# Patient Record
Sex: FEMALE | Race: WHITE | NOT HISPANIC OR LATINO | Employment: UNEMPLOYED | URBAN - METROPOLITAN AREA
[De-identification: names, ages, dates, MRNs, and addresses within clinical notes are randomized per-mention and may not be internally consistent; named-entity substitution may affect disease eponyms.]

---

## 2018-04-20 ENCOUNTER — PRE VISIT (OUTPATIENT)
Dept: NEUROLOGY | Facility: CLINIC | Age: 53
End: 2018-04-20

## 2018-04-20 DIAGNOSIS — G60.0 CMT (CHARCOT-MARIE-TOOTH DISEASE): Primary | ICD-10-CM

## 2018-04-20 NOTE — TELEPHONE ENCOUNTER
CMT RETURN PATIENT    How has their mobility been since the last office visit?   If there has been an increase in falls or any mobility concerns, have them see the Physical Therapist. Do they have any assistive devices or are they interested in finding out more about how to obtain these.has fallen a few times,     Any concerns about hand weakness or pain? Trouble doing things around the house such as opening jars, zipping and buttoning, writing, or other ADL s?   If so have them see the Occupational Therapist hand weakness is worse than leg weakness - should OT,     Do they know what type of CMT they have?   If not and they would like to find out, schedule them with the genetic counselor. They can also schedule with the GC if they have other questions regarding family planning or have any other questions. CMT1A     Do they wear orthotics (foot or AFOs)?   If so, are they working well for them? If not, schedule with the orthotist.  If they don t wear orthotics and would like to meet with them to see what options there are for bracing, schedule with the orthotist. Does have a new set of AFOs, has an orthotist at home     We will have a  available in clinic in case you have any questions. They can help connect pt s with community resources and discuss disability if applicable. She will stop in to introduce herself while they are in clinic. Facilitate disability     There will also be a representative from the [facility] here in clinic as well.    What are the pt s goals for this visit? 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

## 2018-05-01 ENCOUNTER — OFFICE VISIT (OUTPATIENT)
Dept: NEUROLOGY | Facility: CLINIC | Age: 53
End: 2018-05-01
Payer: COMMERCIAL

## 2018-05-01 ENCOUNTER — HOSPITAL ENCOUNTER (OUTPATIENT)
Dept: OCCUPATIONAL THERAPY | Facility: CLINIC | Age: 53
Setting detail: THERAPIES SERIES
End: 2018-05-01
Attending: FAMILY MEDICINE
Payer: COMMERCIAL

## 2018-05-01 ENCOUNTER — RESEARCH ENCOUNTER (OUTPATIENT)
Dept: NEUROLOGY | Facility: CLINIC | Age: 53
End: 2018-05-01

## 2018-05-01 ENCOUNTER — TELEPHONE (OUTPATIENT)
Dept: NEUROLOGY | Facility: CLINIC | Age: 53
End: 2018-05-01

## 2018-05-01 VITALS
SYSTOLIC BLOOD PRESSURE: 169 MMHG | DIASTOLIC BLOOD PRESSURE: 84 MMHG | BODY MASS INDEX: 20.49 KG/M2 | HEIGHT: 64 IN | HEART RATE: 63 BPM | OXYGEN SATURATION: 100 % | WEIGHT: 120 LBS

## 2018-05-01 DIAGNOSIS — G60.0 CMT (CHARCOT-MARIE-TOOTH DISEASE): Primary | ICD-10-CM

## 2018-05-01 PROCEDURE — 97535 SELF CARE MNGMENT TRAINING: CPT | Mod: GO | Performed by: OCCUPATIONAL THERAPIST

## 2018-05-01 PROCEDURE — 97165 OT EVAL LOW COMPLEX 30 MIN: CPT | Mod: GO | Performed by: OCCUPATIONAL THERAPIST

## 2018-05-01 PROCEDURE — 99215 OFFICE O/P EST HI 40 MIN: CPT | Performed by: PSYCHIATRY & NEUROLOGY

## 2018-05-01 PROCEDURE — 40000250 ZZH STATISTIC VISIT MD CLINIC: Performed by: OCCUPATIONAL THERAPIST

## 2018-05-01 ASSESSMENT — PAIN SCALES - GENERAL: PAINLEVEL: MILD PAIN (2)

## 2018-05-01 NOTE — LETTER
2018         RE: Lakesha Keita  [address] [facility] [address]        Dear Colleague,    Thank you for referring your patient, Lakesha Keita, to the [facility]. Please see a copy of my visit note below.      [id number]    May 1, 2018            Michela Souza MD   [address]   [place] [address]      RE:  Lakesha Keita   MRN:  [id number]   :  61      Dear Dr. Souza:      I saw Lakesha Keita and her  at the [facility] where I saw her for followup of her diagnosed CMT 1a.  Ms. Keita was last seen in 2016.  Since then, there has been some progression of disability.  In particular, she may be in need of some accommodations to help her with her work.  She has worked as a  for 34 years, but there but a new employer as increased the demands for rate of chart completion.  She has considerable fatigue after full work days as well.  She falls infrequently and is otherwise doing fairly well.  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      EXAMINATION:  The patient appears well.  Blood pressure is 168 systolic.  Vital signs are otherwise normal.  Cranial examination is normal.  There is no neck flexion or extension weakness and her previously documented neck discomfort has resolved.  Examination of the extremities demonstrates essentially full range of motion in the distal extremities.  Perception of light touch is reduced distal to the hand dorsa and the thighs.  Position sense is reduced to full excursions at the right foot and is absent at the left foot.  Vibration scores are 4 at the right tibial tuberosity and 0 at the left.  Ankle dorsiflexion strength is 3 bilaterally.  Interosseous strength is  also 3 at APB and FDI.  She walks independently, but cautiously.      I met with Ms. Nguyen and her  for greater than 40 minutes, the majority of which was spent in counseling and coordination of care.  Her examination demonstrates modest progression since she was seen in 2016, insufficient to require an implication of other medical factors.  She does have a remote history of carpal tunnel syndrome, but currently denies focal sensory symptoms in the median innervated fingers, wrist pain, or sensory symptoms exacerbated by activity.  Furthermore, her weakness is symmetrically distributed between median and ulnar distribution of the pattern consistent with CMT 1a.  Therefore, active carpal tunnel syndrome does not seem likely.  Lakesha did meet with our occupational therapist who recommended options for meeting with an assistive .  That said, however, we initiated a conversation of her options should her current work demands not be appropriate for her abilities.  She will also meet with our physical therapist.  She has met with her orthotist and received new carbon fiber braces; however, I explained that given some infra occasional ankle instability, she may benefit from having them built up outside of the lateral malleolus.  Finally, we reviewed current research interventions for CMT 1a and the potential for new therapies going forward.      I will meet with Ms. Nguyen in followup in 1 year or earlier if needed.  I have offered a social work consultation if necessary to assist with changes in her occupational situation.         Sincerely,      MARCELLA BANKS MD             D: 2018   T: 2018   MT: TENA      Name:     LAKESHA NGUYEN   MRN:      [id number]        Account:      [id number]   :      1965      Document: [id number]       cc: Michela Souza MD       Again, thank you for allowing me to participate in the care of your patient.         Sincerely,        Scott Vyas MD

## 2018-05-01 NOTE — MR AVS SNAPSHOT
After Visit Summary   2018    Lakesha Keita    MRN: [id number]           Patient Information     Date Of Birth          1965        Visit Information        Provider Department      2018 4:00 PM Scott Vyas MD [facility]         Follow-ups after your visit        Who to contact     If you have questions or need follow up information about today's clinic visit or your schedule please contact [facility] directly at [phone].  Normal or non-critical lab and imaging results will be communicated to you by MyChart, letter or phone within 4 business days after the clinic has received the results. If you do not hear from us within 7 days, please contact the clinic through MyChart or phone. If you have a critical or abnormal lab result, we will notify you by phone as soon as possible.  Submit refill requests through [employer] or call your pharmacy and they will forward the refill request to us. Please allow 3 business days for your refill to be completed.          Additional Information About Your Visit        MyChart Information     [employer] is an electronic gateway that provides easy, online access to your medical records. With [employer], you can request a clinic appointment, read your test results, renew a prescription or communicate with your care team.     To sign up for [employer] visit the website at [email or web address]   You will be asked to enter the access code listed below, as well as some personal information. Please follow the directions to create your username and password.     Your access code is: [email or web address]  Expires: 2018  3:52 PM     Your access code will  in 90 days. If you need help or a new code, please contact your [facility] or call [phone] for assistance.        Care EveryWhere ID     This is your Care EveryWhere ID. This could be used by other organizations to access your [place]  "medical records  [id number]        Your Vitals Were     Pulse Height Pulse Oximetry BMI (Body Mass Index)          63 1.626 m (5' 4\") 100% 20.6 kg/m2         Blood Pressure from Last 3 Encounters:   05/01/18 169/84   01/05/16 104/60    Weight from Last 3 Encounters:   05/01/18 54.4 kg (120 lb)   01/05/16 55.1 kg (121 lb 8 oz)              Today, you had the following     No orders found for display       Primary Care Provider Office Phone # Fax #    Michela Damian [phone]       [facility] [address] [facility] [address]        Equal Access to Services     ZULMA BLOOM : Alla Delcid, annabelle umanzor, qaedilia kaalmajayshree godwin, jaret rayo. So [facility] [phone].    ATENCIÓN: Si habla español, tiene a cruz disposición servicios gratuitos de asistencia lingüística. Llame al [phone].    We comply with applicable federal civil rights laws and [place] laws. We do not discriminate on the basis of race, color, national origin, age, disability, sex, sexual orientation, or gender identity.            Thank you!     Thank you for choosing [facility]  for your care. Our goal is always to provide you with excellent care. Hearing back from our patients is one way we can continue to improve our services. Please take a few minutes to complete the written survey that you may receive in the mail after your visit with us. Thank you!             Your Updated Medication List - Protect others around you: Learn how to safely use, store and throw away your medicines at www.disposemymeds.org.          This list is accurate as of 5/1/18  5:20 PM.  Always use your most recent med list.                   Brand Name Dispense Instructions for use Diagnosis    aspirin 81 MG tablet      Take 1 tablet by mouth daily.        ATORVASTATIN CALCIUM PO      Take 20 mg by mouth daily        BENEFIBER PO           vitamin D3 12324 UNITS " capsule    CHOLECALCIFEROL     Take 50,000 Units by mouth every 7 days

## 2018-05-01 NOTE — PROGRESS NOTES
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  Gets neck injections every 6 months. History of bilateral hip replacements.     Cardio-respiratory status: NA. Does do cardio exercise regularly (bike, elliptical, walking)     Living environment:  House     Living environment barriers:  2 level walk-out, 6+6 stairs within home (1 railing present)                   Current assistance/living environment:  Lives with spouse       Current mobility equipment:  Orthotics: bilateral AFO's     Current ADL equipment:  Shower/tub chair   Toilet safety frame   Sometimes uses a sock aide due to hip impairment (previous replacements)    Technology Used: smartphone, computer, has an adaptive mouse for work, and spouse recently bought her a \"tad\" keyboard to help with work     Patient concerns/goals: Pt's primary goal today is learning adaptations and available equipment for increased speed and effficiency with computer use.  She has difficulty controlling her mouse, especially when needing to select between multiple or small options on the screen.    Evaluation   Interview completed     strength: R hand 23 lb, L hand 26 lb  (mean is 57 lb right, 47 lb left)    Tripod Pinch: 6 lb Right, 5 lb Left (mean is 16 right, left 15)     Lateral pinch: 8 lb bilaterally (mean is 15 right, 14 left)    Cognition:  WNL    Coordination: 9 hole peg score: R hand 30, L hand 32  (Norm is approx 20 seconds for R and 22 for L)      Fall Risk Screen:   Has the patient fallen 2 or more times in the last year? No      Has the patient fallen and had an injury in the " past year? No       Timed Up and Go Score: not tested    Is the patient a fall risk? Yes, department fall risk interventions implemented     Impairments:  Fatigue  Muscle atrophy  Coordination     Treatment diagnosis:  Impaired activities of daily living    Assessment of Occupational Performance: 1-3 Performance Deficits  Identified Performance Deficits (ie: feeding, social skills): opening containers, carrying objects, computer use/typing  Clinical Decision Making (Complexity): Low complexity     Recommendations/Plan of care:  Patient would benefit from interventions to enhance safety and independence.  Rehab potential good for stated goals.  1 session evaluation & treatment.  Recommend referral to:  Either [facility], [facility], or the STAR program to work with an Assistive     Goals:   Target date:   Patient, family and/or caregiver will verbalize understanding of evaluation results and implications for functional performance.  Patient, family and/or caregiver will verbalize/demonstrate understanding of compensatory methods /equipment to enhance functional independence and safety.  Patient, family and/or caregiver will verbalize/demonstrate understanding of home program.  Patient, family and/or caregiver will verbalize/demonstrate understanding of positioning techniques/equipment.      Educational assessment/barriers to learning:  No barriers noted      Treatment provided this date:   Self care/home management, 40 minutes.  Educated on strategies for energy conservation as they relate to CMT, in particular avoiding overuse syndrome with hands with prolonged typing.  Educated on available types of ergonomic keyboard and/or mouse, and how to obtain a trial device through the [place] STAR program. Also provided info for the [facility] and the [facility], which offers device demonstration and trials of higher tech devices like eye-gaze technology. Pt was informed that eye gaze  technology is typically designed for persons with no UE function, so this would likely not be a faster option. Recommend following up with an assistive  for further education on available technology.  Pt was also instructed on ways to modify her computer settings, such as slowing down the keyboard and mouse response rate, limiting the number of icons in her Epic workspace, and working with her supervisor to develop more pre-set templates to avoid the amount of redundant typing.  Educated on home computer ergonomics, including ensuring that knees, hips, and elbows are 90 degrees, and consideration of forearm supports when typing.      Response to treatment/recommendations: Pt and spouse demo'd understanding of all instruction    Goal attainment:  All goals met    Risks and benefits of evaluation/treatment have been explained.  Patient, family and/or caregiver are in agreement with Plan of Care.     Timed Code Treatment Minutes: 40  Total Treatment Time (sum of timed and untimed services): 55    Signature: AG Ma/JIA   Date: 5/1/2018

## 2018-05-01 NOTE — PROGRESS NOTES
Diane velasquez [facility] Neuromuscular Registry    IRB # [id number]  PI: Jude Freire MD, PhD  : Jud Butts    Patient was approached for possible participation for the above study. The current approved IRB consent form was discussed and explained to the patient.  It was discussed that involvement with the study is voluntary and refusal to participate would not involve penalty or decrease benefits at which the patient is entitled, and the subject may discontinue his/her involvement at any time without penalty or loss in benefits. We also discussed that this study does not have follow up visits or procedures. Patient was informed that an additional contact might occur if data needed was not found in patient s medical record. The patient was given time to review and ask any questions about the consent. Patient was shown contact information for PI and study staff in consent for future questions. Patient verbalized understanding of consent and study by restating the purpose, procedures, duration, risk, confidentiality of PHI, and voluntarily participation. Patient printed, signed and dated the consent and HIPAA form prior to study involvement. A copy was given to the patient for their records.     Subject Consent/HIPAA : SIGNED ON 5.1.2018

## 2018-05-01 NOTE — PATIENT INSTRUCTIONS
Let us know how things go with the work situation. If needed I will have our  consult with you by phone.

## 2018-05-01 NOTE — NURSING NOTE
"Lakesha Keita's goals for this visit include: recheck  She requests these members of her care team be copied on today's visit information:     PCP: Michela Munguia    Referring Provider:  No referring provider defined for this encounter.    Chief Complaint   Patient presents with     RECHECK       Initial /84  Pulse 63  Ht 1.626 m (5' 4\")  Wt 54.4 kg (120 lb)  SpO2 100%  BMI 20.6 kg/m2 Estimated body mass index is 20.6 kg/(m^2) as calculated from the following:    Height as of this encounter: 1.626 m (5' 4\").    Weight as of this encounter: 54.4 kg (120 lb).  Medication Reconciliation: complete    "

## 2018-05-01 NOTE — IP AVS SNAPSHOT
"                  MRN:[id number]                      After Visit Summary   2018    Lakesha Keita    MRN: [id number]           Visit Information        Provider Department      2018  3:15 PM Laci Anderson, OT [facility]        Your next 10 appointments already scheduled     May 01, 2018  4:00 PM CDT   Return Visit with Scott Vyas MD   [facility]    [address] [place] [id number] [phone]                Further instructions from your care team       OT recommendations from May 1, 2018    1. Make sure that mouse and keyboard settings are slow enough that you are able to maintain control of the mouse and your typing.  2.  Utilizing as many \"dot phrases\" in your work as possible.  For instance, when you email providers, if you have a template already set up, it can make the messaging providers quicker  3. Look into the [place] Star Program  OR the PACER program for device loaner programs   4. We will look into other options close to home for meeting with an ATP (Assistive ) to help with more training and trying out equipment for your typing/mousing.  5. Another resource is working with your Occupational Health Department to see if they have any adaptive devices for you to use at work.   6. For the \"non-slip\" material, what we use is called \"Dycem\"       Laci Andersno OTR/L  [facility]  [email or web address], Phone: [phone]  [phone]      [employer] Information     [employer] lets you send messages to your doctor, view your test results, renew your prescriptions, schedule appointments and more. To sign up, go to [email or web address] . Click on \"Log in\" on the left side of the screen, which will take you to the Welcome page. Then click on \"Sign up Now\" on the right side of the page.     You will be asked to enter the access code " listed below, as well as some personal information. Please follow the directions to create your username and password.     Your access code is: [email or web address]  Expires: 2018  3:52 PM     Your access code will  in 90 days. If you need help or a new code, please call your [place] clinic or [phone].        Care EveryWhere ID     This is your Care EveryWhere ID. This could be used by other organizations to access your [place] medical records  [id number]        Equal Access to Services     ZULMA BLOOM : Alla Delcid, wacarol umanzor, lubna brooksalnakul godwin, jaret tomlin . So [facility] [phone].    ATENCIÓN: Si habla español, tiene a cruz disposición servicios gratuitos de asistencia lingüística. Llame al [phone].    We comply with applicable federal civil rights laws and [place] laws. We do not discriminate on the basis of race, color, national origin, age, disability, sex, sexual orientation, or gender identity.

## 2018-05-01 NOTE — DISCHARGE INSTRUCTIONS
"OT recommendations from May 1, 2018    1. Make sure that mouse and keyboard settings are slow enough that you are able to maintain control of the mouse and your typing.  2.  Utilizing as many \"dot phrases\" in your work as possible.  For instance, when you email providers, if you have a template already set up, it can make the messaging providers quicker  3. Look into the [place] Star Program  OR the PACER program for device loaner programs   4. We will look into other options close to home for meeting with an ATP (Assistive ) to help with more training and trying out equipment for your typing/mousing.  5. Another resource is working with your Occupational Health Department to see if they have any adaptive devices for you to use at work.   6. For the \"non-slip\" material, what we use is called \"Dycem\"       Laci Anderson OTR/L  [facility]  [email or web address], Phone: [phone]  [phone]    "

## 2018-05-01 NOTE — TELEPHONE ENCOUNTER
Called and reminded pt of her 2:30appt today in [facility] CMT clinic.  Anahi Pacheco CMA

## 2018-05-02 NOTE — PROGRESS NOTES
May 1, 2018            Michela Souza MD   [address]   [place] [address]      RE:  Lakesha Keita   MRN:  [id number]   :  61      Dear Dr. Souza:      I saw Lakesha Keita and her  at the [facility] where I saw her for followup of her diagnosed CMT 1a.  Ms. Keita was last seen in 2016.  Since then, there has been some progression of disability.  In particular, she may be in need of some accommodations to help her with her work.  She has worked as a  for 34 years, but there but a new employer as increased the demands for rate of chart completion.  She has considerable fatigue after full work days as well.  She falls infrequently and is otherwise doing fairly well.  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      EXAMINATION:  The patient appears well.  Blood pressure is 168 systolic.  Vital signs are otherwise normal.  Cranial examination is normal.  There is no neck flexion or extension weakness and her previously documented neck discomfort has resolved.  Examination of the extremities demonstrates essentially full range of motion in the distal extremities.  Perception of light touch is reduced distal to the hand dorsa and the thighs.  Position sense is reduced to full excursions at the right foot and is absent at the left foot.  Vibration scores are 4 at the right tibial tuberosity and 0 at the left.  Ankle dorsiflexion strength is 3 bilaterally.  Interosseous strength is also 3 at APB and FDI.  She walks independently, but cautiously.      I met with Ms. Keita and her  for greater than 40 minutes, the majority of which was spent in counseling and coordination of care.  Her examination demonstrates modest progression  since she was seen in 2016, insufficient to require an implication of other medical factors.  She does have a remote history of carpal tunnel syndrome, but currently denies focal sensory symptoms in the median innervated fingers, wrist pain, or sensory symptoms exacerbated by activity.  Furthermore, her weakness is symmetrically distributed between median and ulnar distribution of the pattern consistent with CMT 1a.  Therefore, active carpal tunnel syndrome does not seem likely.  Lakesha did meet with our occupational therapist who recommended options for meeting with an assistive .  That said, however, we initiated a conversation of her options should her current work demands not be appropriate for her abilities.  She will also meet with our physical therapist.  She has met with her orthotist and received new carbon fiber braces; however, I explained that given some infra occasional ankle instability, she may benefit from having them built up outside of the lateral malleolus.  Finally, we reviewed current research interventions for CMT 1a and the potential for new therapies going forward.      I will meet with Ms. Nguyen in followup in 1 year or earlier if needed.  I have offered a social work consultation if necessary to assist with changes in her occupational situation.         Sincerely,      MARCELLA BANKS MD             D: 2018   T: 2018   MT: TENA      Name:     LAKESHA NGUYEN   MRN:      [id number]        Account:      [id number]   :      1965      Document: [id number]       cc: Michela Souza MD

## 2018-05-12 ENCOUNTER — HEALTH MAINTENANCE LETTER (OUTPATIENT)
Age: 53
End: 2018-05-12

## 2018-05-22 ENCOUNTER — MYC MEDICAL ADVICE (OUTPATIENT)
Dept: NEUROLOGY | Facility: CLINIC | Age: 53
End: 2018-05-22

## 2019-01-25 ENCOUNTER — TRANSFERRED RECORDS (OUTPATIENT)
Dept: HEALTH INFORMATION MANAGEMENT | Facility: CLINIC | Age: 54
End: 2019-01-25

## 2019-03-10 ENCOUNTER — MYC MEDICAL ADVICE (OUTPATIENT)
Dept: NEUROLOGY | Facility: CLINIC | Age: 54
End: 2019-03-10

## 2019-03-11 NOTE — TELEPHONE ENCOUNTER
"Kristina,   Lamisil is not listed on the CMTA's Toxic Medication list. Do you know if there could be any correlation between her worsening symptoms and the Lamasil? She is experiencing \"bilateral total arm numbness, it actually gets me up at night. It does go away for the most part during the day. 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   Summer Mota RN     "

## 2019-03-11 NOTE — TELEPHONE ENCOUNTER
There have been no reports of terbinafine-associated neuropathy from what I can find. I also don't see that it is considered a toxic medication for CMT disease.    Kristina Durbin, Pharm.D.  Medication Therapy Management Pharmacist  Phone: [phone]

## 2019-05-21 ENCOUNTER — TELEPHONE (OUTPATIENT)
Dept: NEUROLOGY | Facility: CLINIC | Age: 54
End: 2019-05-21

## 2019-05-21 ENCOUNTER — OFFICE VISIT (OUTPATIENT)
Dept: NEUROLOGY | Facility: CLINIC | Age: 54
End: 2019-05-21
Payer: COMMERCIAL

## 2019-05-21 VITALS — DIASTOLIC BLOOD PRESSURE: 76 MMHG | SYSTOLIC BLOOD PRESSURE: 141 MMHG | HEART RATE: 62 BPM | OXYGEN SATURATION: 97 %

## 2019-05-21 DIAGNOSIS — G60.0 CMT (CHARCOT-MARIE-TOOTH DISEASE): Primary | ICD-10-CM

## 2019-05-21 PROCEDURE — 99213 OFFICE O/P EST LOW 20 MIN: CPT | Performed by: PSYCHIATRY & NEUROLOGY

## 2019-05-21 RX ORDER — CHOLECALCIFEROL (VITAMIN D3) 50 MCG
1 TABLET ORAL DAILY
COMMUNITY

## 2019-05-21 RX ORDER — TERBINAFINE HYDROCHLORIDE 250 MG/1
1 TABLET ORAL DAILY
Refills: 1 | COMMUNITY
Start: 2019-03-21 | End: 2023-05-09

## 2019-05-21 RX ORDER — WHEAT DEXTRIN 3 G/3.8 G
POWDER (GRAM) ORAL DAILY
COMMUNITY
End: 2023-05-09

## 2019-05-21 NOTE — TELEPHONE ENCOUNTER
----- Message from Scott Vyas MD sent at 5/21/2019  1:16 PM CDT -----  Regarding: maulik Crowder,    This patient and I discussed her podiatrist's concern about blood flow in the feet. I doubt there's a serious problem but I mentioned discussing it with PCP but forgot to include in AVS. Could you please call and tell her to see her doctor about her blood flow in the feet? Thanks.

## 2019-05-21 NOTE — LETTER
5/21/2019         RE: Lakesha Keita  [address] [facility] [address]        Dear Colleague,    Thank you for referring your patient, Lakesha Keita, to the [facility]. Please see a copy of my visit note below.    Return visit for 54 year old woman with CMT1A. No substantial change. Knees buckle on occasion. No other substantial change. Denies change in voice, swallowing, or breathing.       Mental state: Alert, appropriate, speech, language, and thought content normal.     Cranial nerves II-XII normal except for dysphonia, which I did not investigate further as she denied awareness. It may be longstanding and physiologic.      Sensory examination:   Right Left   Light touch MC, PIP MC, PIP   Vibration (timed)     Vibration (Rydell-Seiffer) K1-2 K1-2   Pin Normal Normal   Position     Legend:   MM = medial malleolus, TT = tibial tuberosity, K = patella, MCP = MCP joint  MF = mid-foot, DC = distal calf, MC = mid calf, PC = proximal calf      Manual muscle testing (A indicates atrophy):   Right Left   Shoulder abduction  5 5   Elbow extension 5 5   Elbow flexion 5 5   Wrist extension  5 5   Finger extension 5 5   FDI 5 5   APB 5 5   Hip flexion 5 5   Knee flexion 5 5   Knee extension 5 5   Dorsiflexion 0 0   Plantar flexion 1 1   Muscle tone: reduced distally       Reflexes:   Right Left   Triceps 2 2   Biceps 2 2   Brachioradialis 2 2   Nga 2 2   Patellar 2 2   Achilles 2 2   Plantar Flexor Flexor   Clonus Absent Absent      Patient reports that her podiatrist could not get foot pulses with doppler. On exam I find intact skin, mild distal erythema, 1+ right DP pulse only. Nonsmoker.      Impression:  CMT1A with moderately severe distal weakness and loss of function affecting gait, ADLs, and work. Examination is stable compared with prior visits.    Recommendations:  We discussed medications for CMT1A.   She denies a need for evaluation with orthotist.  I think substantial PAD  is unlikely but she should discuss with PCP.              Again, thank you for allowing me to participate in the care of your patient.        Sincerely,        Scott Vyas MD

## 2019-05-21 NOTE — PROGRESS NOTES
Return visit for 54 year old woman with CMT1A. No substantial change. Knees buckle on occasion. No other substantial change. Denies change in voice, swallowing, or breathing.       Mental state: Alert, appropriate, speech, language, and thought content normal.     Cranial nerves II-XII normal except for dysphonia, which I did not investigate further as she denied awareness. It may be longstanding and physiologic.      Sensory examination:   Right Left   Light touch MC, PIP MC, PIP   Vibration (timed)     Vibration (Rydell-Seiffer) K1-2 K1-2   Pin Normal Normal   Position     Legend:   MM = medial malleolus, TT = tibial tuberosity, K = patella, MCP = MCP joint  MF = mid-foot, DC = distal calf, MC = mid calf, PC = proximal calf      Manual muscle testing (A indicates atrophy):   Right Left   Shoulder abduction  5 5   Elbow extension 5 5   Elbow flexion 5 5   Wrist extension  5 5   Finger extension 5 5   FDI 5 5   APB 5 5   Hip flexion 5 5   Knee flexion 5 5   Knee extension 5 5   Dorsiflexion 0 0   Plantar flexion 1 1   Muscle tone: reduced distally       Reflexes:   Right Left   Triceps 2 2   Biceps 2 2   Brachioradialis 2 2   Nga 2 2   Patellar 2 2   Achilles 2 2   Plantar Flexor Flexor   Clonus Absent Absent      Patient reports that her podiatrist could not get foot pulses with doppler. On exam I find intact skin, mild distal erythema, 1+ right DP pulse only. Nonsmoker.      Impression:  CMT1A with moderately severe distal weakness and loss of function affecting gait, ADLs, and work. Examination is stable compared with prior visits.    Recommendations:  We discussed medications for CMT1A.   She denies a need for evaluation with orthotist.  I think substantial PAD is unlikely but she should discuss with PCP.

## 2019-05-21 NOTE — TELEPHONE ENCOUNTER
I called patient and asked her to return call to discuss the information below.    Lakesha Campos LPN

## 2020-02-08 ENCOUNTER — HEALTH MAINTENANCE LETTER (OUTPATIENT)
Age: 55
End: 2020-02-08

## 2020-06-09 ENCOUNTER — VIRTUAL VISIT (OUTPATIENT)
Dept: NEUROLOGY | Facility: CLINIC | Age: 55
End: 2020-06-09
Payer: COMMERCIAL

## 2020-06-09 ENCOUNTER — TELEPHONE (OUTPATIENT)
Dept: NEUROLOGY | Facility: CLINIC | Age: 55
End: 2020-06-09

## 2020-06-09 DIAGNOSIS — G60.0 CMT (CHARCOT-MARIE-TOOTH DISEASE): Primary | ICD-10-CM

## 2020-06-09 PROCEDURE — 99212 OFFICE O/P EST SF 10 MIN: CPT | Mod: TEL | Performed by: PSYCHIATRY & NEUROLOGY

## 2020-06-09 RX ORDER — CYANOCOBALAMIN (VITAMIN B-12) 2500 MCG
TABLET, SUBLINGUAL SUBLINGUAL
COMMUNITY
End: 2023-05-09

## 2020-06-09 RX ORDER — LISINOPRIL 20 MG/1
20 TABLET ORAL
COMMUNITY
Start: 2020-04-21 | End: 2023-05-09

## 2020-06-09 NOTE — TELEPHONE ENCOUNTER
I called patient to let her know that we are not having in patient in clinic yet and that we need to reschedule her appt to a video appt.    Lakesha Campos LPN

## 2020-06-09 NOTE — PROGRESS NOTES
"Lakesha Keita is a 55 year old female who is being evaluated via a billable telephone visit.      The patient has been notified of following:     \"This telephone visit will be conducted via a call between you and your physician/provider. We have found that certain health care needs can be provided without the need for a physical exam.  This service lets us provide the care you need with a short phone conversation.  If a prescription is necessary we can send it directly to your pharmacy.  If lab work is needed we can place an order for that and you can then stop by our lab to have the test done at a later time.    Telephone visits are billed at different rates depending on your insurance coverage. During this emergency period, for some insurers they may be billed the same as an in-person visit.  Please reach out to your insurance provider with any questions.    If during the course of the call the physician/provider feels a telephone visit is not appropriate, you will not be charged for this service.\"    Patient has given verbal consent for Telephone visit?  Yes    What phone number would you like to be contacted at? [phone]    How would you like to obtain your AVS? [facility]     [id number]    11 minutes              "

## 2020-06-09 NOTE — LETTER
"    2020         RE: Lakesha Keita  [address] [facility] [address]        Dear Colleague,    Thank you for referring your patient, Lakesha Keita, to the [facility]. Please see a copy of my visit note below.    2020            Michela Souza MD   [facility]   [address] [place] [address] [address]   [place] [address]      Patient:  Lakesha Keita   MRN:  [id number]   :        Dear Dr. Souza:      I spoke with Lakesha Keita via a billable telephone visit today for followup of her established diagnosis of CMT1A.  Ms. Keita reports that she has gradually weakened over time in a manner and rate consistent with her lifelong pattern of CMT without a substantial acceleration or change.  She reports that she is still getting used to a new pair of modified AFOs and is working with [facility] in [place].  She is comfortable with her care.  She reports no therapy needs.  She is continuing to do strengthening and stretching exercises that were recommended by our physical therapist at her last visit.  She does note that she has occasional lightheadedness when standing up from a seated position or after picking up objects.  She is working with your office for management of this.  She is monitoring her blood pressure and following recommendations made by you recently made by her partner.      Lakesha is interested in a followup visit next year.  In the interim, she will send [facility] papers for us to fill again.            Sincerely,      MARCELLA BANKS MD          D: 2020   T: 06/10/2020   MT: SANDRA     Lakesha Keita is a 55 year old female who is being evaluated via a billable telephone visit.      The patient has been notified of following:     \"This telephone visit will be conducted via a call between you and your physician/provider. We have found that certain health care needs can be provided without the need for a physical exam.  This " "service lets us provide the care you need with a short phone conversation.  If a prescription is necessary we can send it directly to your pharmacy.  If lab work is needed we can place an order for that and you can then stop by our lab to have the test done at a later time.    Telephone visits are billed at different rates depending on your insurance coverage. During this emergency period, for some insurers they may be billed the same as an in-person visit.  Please reach out to your insurance provider with any questions.    If during the course of the call the physician/provider feels a telephone visit is not appropriate, you will not be charged for this service.\"    Patient has given verbal consent for Telephone visit?  Yes    What phone number would you like to be contacted at? [phone]    How would you like to obtain your AVS? MyChart     11 minutes       Name:     AJITH NGUYEN   MRN:      [id number]        Account:      [id number]   :      1965      Document: [id number]       cc: Michela Souza MD       Again, thank you for allowing me to participate in the care of your patient.        Sincerely,        Scott Vyas MD    "

## 2020-06-10 NOTE — PROGRESS NOTES
"2020            Michela Souza MD   [facility]   [address] [place] [address] [address]   [place] [address]      Patient:  Lakesha Keita   MRN:  [id number]   :        Dear Dr. Souza:      I spoke with Lakesha Keita via a billable telephone visit today for followup of her established diagnosis of CMT1A.  Ms. Keita reports that she has gradually weakened over time in a manner and rate consistent with her lifelong pattern of CMT without a substantial acceleration or change.  She reports that she is still getting used to a new pair of modified AFOs and is working with [facility] in [place].  She is comfortable with her care.  She reports no therapy needs.  She is continuing to do strengthening and stretching exercises that were recommended by our physical therapist at her last visit.  She does note that she has occasional lightheadedness when standing up from a seated position or after picking up objects.  She is working with your office for management of this.  She is monitoring her blood pressure and following recommendations made by you recently made by her partner.      Lakesha is interested in a followup visit next year.  In the interim, she will send [facility] papers for us to fill again.            Sincerely,      MARCELLA BANKS MD          D: 2020   T: 06/10/2020   MT: SANDRA     Lakesha Keita is a 55 year old female who is being evaluated via a billable telephone visit.      The patient has been notified of following:     \"This telephone visit will be conducted via a call between you and your physician/provider. We have found that certain health care needs can be provided without the need for a physical exam.  This service lets us provide the care you need with a short phone conversation.  If a prescription is necessary we can send it directly to your pharmacy.  If lab work is needed we can place an order for that and you can then stop by our lab to have the " "test done at a later time.    Telephone visits are billed at different rates depending on your insurance coverage. During this emergency period, for some insurers they may be billed the same as an in-person visit.  Please reach out to your insurance provider with any questions.    If during the course of the call the physician/provider feels a telephone visit is not appropriate, you will not be charged for this service.\"    Patient has given verbal consent for Telephone visit?  Yes    What phone number would you like to be contacted at? [phone]    How would you like to obtain your AVS? MyChart     11 minutes       Name:     AJITH NGUYEN   MRN:      [id number]        Account:      [id number]   :      1965      Document: [id number]       cc: Michela Souza MD     "

## 2020-11-08 ENCOUNTER — HEALTH MAINTENANCE LETTER (OUTPATIENT)
Age: 55
End: 2020-11-08

## 2021-03-27 ENCOUNTER — HEALTH MAINTENANCE LETTER (OUTPATIENT)
Age: 56
End: 2021-03-27

## 2021-04-19 ENCOUNTER — PRE VISIT (OUTPATIENT)
Dept: NEUROLOGY | Facility: CLINIC | Age: 56
End: 2021-04-19

## 2021-04-19 DIAGNOSIS — G60.0 CMT (CHARCOT-MARIE-TOOTH DISEASE): Primary | ICD-10-CM

## 2021-04-19 NOTE — TELEPHONE ENCOUNTER
"CMT RETURN PATIENT    Are their any goals or new issues you would like to address at this appt?   o Would like to know more about research studies  o new AFO's are difficult to walk with  o new issue of burning pain in fingers mainly on left side (she is R hand dominant though), only 3 episodes since Feb 2020, occurs when sleeping, could be carpel tunnel per pt, got covid shot possibly shot in L arm prior to these symptoms     How has your mobility been since the last office visit? \"slowly getting worse,\" feels weaker lately in legs and feet despite daily exercise routine   Have you had an increase in falls or any mobility concerns? Tripping, within last 6 months fell at home 2 times with no major injury  Do you have any assistive devices (cane, walker, wheelchair) or are you interested in finding out more about how to obtain these? Cane, doesn't use every day but only when she does not have someone around to help her or for longer/more difficult walks     Any concerns about hand weakness or pain? Increased hand wekaness                 Do you have trouble doing things around the house such as opening jars, zipping/buttoning, writing or other activities of daily living? Always had troubles with these things but more trouble lately    Do you know what type of CMT you have? CMT1A         Do you have any other questions for the genetic counselor? none    Do you wear braces such as foot orthotics or AFOs? If so, how are these working for you? If not, is this something you would like to discuss?  Last saw her orthotist back in October 2020, was given new AFO's to wear which were adjusted once but still not fitting well, pt frustrated and would like second opinion as well     We will have a  available in clinic in case you have any questions. They can help connect you with community resources and discuss disability. none    There will also be a representative from the [facility] here in clinic. Yes, if available " "    To determine if you qualify for any CMT research studies, would you be interested in meeting with the research coordinator? Yes, if available                Verify medications and allergies.     The patient will be scheduled to see: orthotist, PT, MDA, research coordinator, would be open to OT if referred by provider     \"We will contact you once the schedule has been completed to let you know what time you need to arrive. Disregard the automated appointment reminder call, I will call you directly to let you know what time to be here.\"    "

## 2021-05-05 NOTE — TELEPHONE ENCOUNTER
Pt contacted and informed of arrival time to be 9:45am. Pt updated that neurology clinic is now located on the first floor and to enter via East entrance and check-in at desk .       Nita Min, RNCC  Neurology

## 2021-05-11 ENCOUNTER — DOCUMENTATION ONLY (OUTPATIENT)
Dept: ORTHOPEDICS | Facility: CLINIC | Age: 56
End: 2021-05-11

## 2021-05-11 ENCOUNTER — HOSPITAL ENCOUNTER (OUTPATIENT)
Dept: PHYSICAL THERAPY | Facility: CLINIC | Age: 56
Setting detail: THERAPIES SERIES
End: 2021-05-11
Attending: FAMILY MEDICINE
Payer: COMMERCIAL

## 2021-05-11 ENCOUNTER — OFFICE VISIT (OUTPATIENT)
Dept: NEUROLOGY | Facility: CLINIC | Age: 56
End: 2021-05-11
Payer: COMMERCIAL

## 2021-05-11 DIAGNOSIS — G60.0 CMT (CHARCOT-MARIE-TOOTH DISEASE): Primary | ICD-10-CM

## 2021-05-11 PROCEDURE — 97110 THERAPEUTIC EXERCISES: CPT | Mod: GP | Performed by: PHYSICAL THERAPIST

## 2021-05-11 PROCEDURE — 97161 PT EVAL LOW COMPLEX 20 MIN: CPT | Mod: GP | Performed by: PHYSICAL THERAPIST

## 2021-05-11 PROCEDURE — 99212 OFFICE O/P EST SF 10 MIN: CPT | Mod: GC | Performed by: PSYCHIATRY & NEUROLOGY

## 2021-05-11 NOTE — PROGRESS NOTES
OUTPATIENT PHYSICAL THERAPY CLINIC NOTE  Lakesha Keita  YOB: 1965  [id number]    Type of visit:         Evaluation & Treatment     Date of service: 2021    Referring provider: Dr. Vyas    IMedical diagnosis:   CMT    Date of diagnosis:     Pertinent history of current problem (include personal factors and/or comorbidities that impact the plan of care): patient endorses feeling weaker; continues with utilize stationary bike & elliptical for 15-20 minutes, 4-5 times/week without difficulty. PMHx: bilateral JANEE with left hip seroma in  requiring aspiration - states that left hip has remained weak since that procedure; right knee OA     Living environment:  House    Living environment barriers:  0 steps thru garage entrance  6 stairs to enter (1 railing present) - thru the back of the house to the deck  12 stairs within home (2 railings present) - split level     Current assistance/living environment:  Lives with spouse      Current mobility equipment:  Orthotics: bilateral AFOs  Standard cane(s)     Current ADL equipment:  Shower/tub chair  Shower/tub grab bar    Technology used: cell phone, computer    Evaluation   Interview completed.   Pain assessment:  Pain present  Location: hands/Ratin/10 at best, 10/10 at worst (burning sensation at night - 3 episodes to date)  Location: right knee/Ratin/10 at best, 4/10 at worst     Range of motion: bilateral L/Es WFL, except limited motion at bilateral ankles (0 degrees dorsiflexion with knee extended)    Manual muscle testing: hip flex 4/5 on right, 3-/5 on left; knee ext 5/5 on right, 4/5 on left; knee flex 4+/5 on right, 4/5 on left; ankle plantar flexion 0/5 bilaterally; ankle plantar flexion 0/5 on right, 1/5 on left   Gait: independent with bilateral AFOs; exhibits increased base of support, weight kept on lateral border of left foot throughout stance phase, decreased armswing on right compared to left   25 ft timed walk: 12 steps  in 7.03 seconds with bilateral AFOs    Fall Risk Screen:   Has the patient fallen 2 or more times in the last year? No      Has the patient fallen and had an injury in the past year? No       Timed Up and Go Score: 10.21 seconds with bilateral AFOs    Is the patient a fall risk? Yes, department fall risk interventions implemented     Impairments:  Muscle atrophy  Balance  Pain  Range of motion     Treatment diagnosis:  Impaired mobility    Clinical Presentation: Evolving/Changing  Clinical Presentation Rationale: based upon subjective information provided, objective exam findings, medical history & clinical judgement   Clinical Decision Making (Complexity): Low complexity     Recommendations/Plan of care:  1 session evaluation & treatment.     Goals:   Target date: 5/11/2021  Patient will verbalize understanding of evaluation results and implications for functional performance.  Patient will verbalize/demonstrate understanding of home program.    Educational assessment/barriers to learning:   No barriers noted     Treatment provided this date:   Therapeutic procedures, 10 minutes    Response to treatment/recommendations:   Reviewed results of PT exam with patient - noted that 25 ft timed walk test & TUG score relatively unchanged since last exam in 2016. Discussed importance of continually monitoring exercise tolerance - patient should feel energized by exercises, not fatigued; recommend modifications if fatigue becomes an issue. Instructed in standing gastroc & soleus stretches, seated toe flexor stretches. Demonstrated each stretch for patient, issued written instructions with illustrations & had patient return demonstration of the stretches to PT; able to complete with correct technique (goal attained). Provided eduction regarding use of 4WW to improve gait efficiency & stability. Instructed in proper use of 4WW, including use of braking system & proper height adjustment. Patient demonstrated safe, independent  amb with 4WW on level by end of visit and is in agreement with requesting 4WW from [facility] equipment loan closet.     Goal attainment:  All goals met     Risks and benefits of evaluation/treatment have been explained.  Patient is in agreement with Plan of Care.     Timed Code Treatment Minutes: 10 min  Total Treatment Time (sum of timed and untimed services): 40 min    Signature: Marli Hobson, PT   Date: 5/11/2021

## 2021-05-11 NOTE — PROGRESS NOTES
S. Patient was seen today, at Dr. Vyas's [facility] clinic, for lower extremity braces    O.Goal. To help reduce foot drop and stabilize ankles (reduce supination) from her CMT.    A. At this time, I have found that Nel has 3 pairs of older custom AFO's.  They include OTS New Era Step afo's with a customized inner boot (newest and the pair she likes the least,) a pair of custom phat braces (2nd favorite) and a pair of custom  articulated afo's with 90 stops (oldest and the pair she likes the best.)  She stated that she received all of these afo's in the past from [facility] [email or web address] [facility] and was/is working with Owen Nelson.  She is currently happy with the plastic braces, but is scared that they may break someday.  I did suggest that when she is ready, they could be remade exactly the same by pouring plaster into them or she could be casted for new braces.  She asked if there was any other braces that may work for her.  I did show her a sample of the [employer] SALCEDO AFO, but said they would be similar to the PHAT braces.  I suggested that if she likes the articulated plastics best, that she may want to go with that style again.  She did not want to start the process for new braces and did not want an Rx. requested.  She will MYCHART message the M.D. when she is ready for new braces.    P. Pt. has been instructed to contact our facility with any future questions and/or concerns.    Yuri LYONS/TENA

## 2021-05-11 NOTE — LETTER
5/11/2021         RE: Lakesha Keita  [address] [address] [address]        Dear Colleague,    Thank you for referring your patient, Lakesha Keita, to the [facility]UR[facility] [place]. Please see a copy of my visit note below.    Neuromuscular CMT Clinic Note    Summary:    Ms. Keita is a 56 year old woman with a h/o genetically confirmed CMT1A who presents for follow up.    Interval History: Last seen via video in June 2020. Overall she feels there has been some mild progression of distal upper and lower limb weakness. She has difficulty with fine motor tasks and uses a cane for stability while walking. She does wear AFOs bilaterally. She has had one fall in the last year. She also described 3 episodes of burning pain in the hands mostly on the left which radiated from the elbow. The episodes occurred during sleep and woke her up. The duration was seconds to minutes. She also describes baseline hip flexion weakness on the left which has been present after hip surgery in 2012 and has been stable.     Past Medical History:   No past medical history on file.    Past Surgical History:  No past surgical history on file.    Medical Allergies:    Allergies   Allergen Reactions     Nkda [No Known Drug Allergies]      Current Medications:     Current Outpatient Medications:      aspirin 81 MG tablet, Take 1 tablet by mouth every other day , Disp: , Rfl:      ATORVASTATIN CALCIUM PO, Take 20 mg by mouth daily, Disp: , Rfl:      biotin 5000 MCG SUBL sublingual tablet, , Disp: , Rfl:      lisinopril (ZESTRIL) 20 MG tablet, Take 20 mg by mouth, Disp: , Rfl:      melatonin 5 MG tablet, Take 5 mg by mouth nightly as needed for sleep, Disp: , Rfl:      terbinafine (LAMISIL) 250 MG tablet, Take 1 tablet by mouth daily, Disp: , Rfl: 1     vitamin D3 (CHOLECALCIFEROL) 2000 units (50 mcg) tablet, Take 1 tablet by mouth daily, Disp: , Rfl:      Wheat Dextrin (BENEFIBER PO), , Disp: , Rfl:       Wheat Dextrin (BENEFIBER) POWD, Take by mouth daily, Disp: , Rfl:      Review of Systems: A complete review of systems was obtained and was negative except for what was noted above.    Physical examination:    There were no vitals taken for this visit.  General Appearance: NAD    Skin: There are no rashes or other skin lesions.    Musculoskeletal:  High arches noted. Tinel and phalen negative bilaterally.     Neurologic examination:    Mental status:  Patient is alert, attentive, and oriented x 3.  Language is coherent and fluent without dysarthria or aphasia.  Memory, comprehension and ability to follow commands were intact.       Cranial nerves: Pupils were round and reacted to light.  Extraocular movements were full. There was no face, jaw, palate or tongue weakness or atrophy. Hearing was grossly intact.  Shoulder shrug was normal.      Motor exam:  Manual muscle testing revealed the following MRC grade muscle power:   Right Left   Neck flexion 5    Neck extension 5    Shoulder abduction 5 5   Elbow extension 5 5   Elbow flexion  5 5   Wrist extension 5 5   Finger extension 4 4   FDI 3 3   APB 3 3   Hip flexion 5 4   Knee extension 5 5   Knee flexion 5 5   Dorsiflexion 0 0   Plantarflexion 1 1     Complex motor skills revealed normal coordination.  Finger-nose- finger and heel to shin were intact.       Sensory exam revealed vibration to be absent at the toes and ankles, 3-4s at the knee.    Gait was normal.  Pt was able to walk on heels, toes and tandem without any difficulty.       Deep tendon reflexes:   Right Left   Triceps 1 1   Biceps 1 1   Brachioradialis 1 1   Knee jerk 0 0   Ankle jerk 0 0   Mackey's negative b/l.     Assessment:    Ms. Keita is a 56 year old woman with a h/o genetically confirmed CMT1A who presents for follow up. Overall examination is mostly unchanged. She will meet with physical therapy and our orthotist today.  We reviewed OT for difficulty with fine motor tasks but she would  like to hold off for now.  She did describe brief intermittent pain at night in the hand which radiated from the elbow on the left. This may be due to an ulnar neuropathy at the elbow. As the symptoms are very infrequent and her exam hasn't changed we will hold off on further workup. If the symptoms become more frequent or persist we can consider EMG or US for further evaluation.     Plan:      1. PT  2. Orthotist  3. Follow up in 1 year    Patient seen and examined with attending neurologist, Dr. Vyas, who agrees with above.     Owen Chowdhury MD  Neuromuscular Fellow    I personally examined the patient and concur with the resident's note.    Scott Vyas M.D.    10 minutes spent on the date of the encounter on chart review, history and examination, documentation and further activities as noted above.      Again, thank you for allowing me to participate in the care of your patient.        Sincerely,        Scott Vyas MD

## 2021-05-11 NOTE — PROGRESS NOTES
Neuromuscular CMT Clinic Note    Summary:    Ms. Keita is a 56 year old woman with a h/o genetically confirmed CMT1A who presents for follow up.    Interval History: Last seen via video in June 2020. Overall she feels there has been some mild progression of distal upper and lower limb weakness. She has difficulty with fine motor tasks and uses a cane for stability while walking. She does wear AFOs bilaterally. She has had one fall in the last year. She also described 3 episodes of burning pain in the hands mostly on the left which radiated from the elbow. The episodes occurred during sleep and woke her up. The duration was seconds to minutes. She also describes baseline hip flexion weakness on the left which has been present after hip surgery in 2012 and has been stable.     Past Medical History:   No past medical history on file.    Past Surgical History:  No past surgical history on file.    Medical Allergies:    Allergies   Allergen Reactions     Nkda [No Known Drug Allergies]      Current Medications:     Current Outpatient Medications:      aspirin 81 MG tablet, Take 1 tablet by mouth every other day , Disp: , Rfl:      ATORVASTATIN CALCIUM PO, Take 20 mg by mouth daily, Disp: , Rfl:      biotin 5000 MCG SUBL sublingual tablet, , Disp: , Rfl:      lisinopril (ZESTRIL) 20 MG tablet, Take 20 mg by mouth, Disp: , Rfl:      melatonin 5 MG tablet, Take 5 mg by mouth nightly as needed for sleep, Disp: , Rfl:      terbinafine (LAMISIL) 250 MG tablet, Take 1 tablet by mouth daily, Disp: , Rfl: 1     vitamin D3 (CHOLECALCIFEROL) 2000 units (50 mcg) tablet, Take 1 tablet by mouth daily, Disp: , Rfl:      Wheat Dextrin (BENEFIBER PO), , Disp: , Rfl:      Wheat Dextrin (BENEFIBER) POWD, Take by mouth daily, Disp: , Rfl:      Review of Systems: A complete review of systems was obtained and was negative except for what was noted above.    Physical examination:    There were no vitals taken for this visit.  General  Appearance: NAD    Skin: There are no rashes or other skin lesions.    Musculoskeletal:  High arches noted. Tinel and phalen negative bilaterally.     Neurologic examination:    Mental status:  Patient is alert, attentive, and oriented x 3.  Language is coherent and fluent without dysarthria or aphasia.  Memory, comprehension and ability to follow commands were intact.       Cranial nerves: Pupils were round and reacted to light.  Extraocular movements were full. There was no face, jaw, palate or tongue weakness or atrophy. Hearing was grossly intact.  Shoulder shrug was normal.      Motor exam:  Manual muscle testing revealed the following MRC grade muscle power:   Right Left   Neck flexion 5    Neck extension 5    Shoulder abduction 5 5   Elbow extension 5 5   Elbow flexion  5 5   Wrist extension 5 5   Finger extension 4 4   FDI 3 3   APB 3 3   Hip flexion 5 4   Knee extension 5 5   Knee flexion 5 5   Dorsiflexion 0 0   Plantarflexion 1 1     Complex motor skills revealed normal coordination.  Finger-nose- finger and heel to shin were intact.       Sensory exam revealed vibration to be absent at the toes and ankles, 3-4s at the knee.    Gait was normal.  Pt was able to walk on heels, toes and tandem without any difficulty.       Deep tendon reflexes:   Right Left   Triceps 1 1   Biceps 1 1   Brachioradialis 1 1   Knee jerk 0 0   Ankle jerk 0 0   Mackey's negative b/l.     Assessment:    Ms. Keita is a 56 year old woman with a h/o genetically confirmed CMT1A who presents for follow up. Overall examination is mostly unchanged. She will meet with physical therapy and our orthotist today.  We reviewed OT for difficulty with fine motor tasks but she would like to hold off for now.  She did describe brief intermittent pain at night in the hand which radiated from the elbow on the left. This may be due to an ulnar neuropathy at the elbow. As the symptoms are very infrequent and her exam hasn't changed we will hold  off on further workup. If the symptoms become more frequent or persist we can consider EMG or US for further evaluation.     Plan:      1. PT  2. Orthotist  3. Follow up in 1 year    Patient seen and examined with attending neurologist, Dr. Vyas, who agrees with above.     Owen Chowdhury MD  Neuromuscular Fellow    I personally examined the patient and concur with the resident's note.    Scott Vyas M.D.    10 minutes spent on the date of the encounter on chart review, history and examination, documentation and further activities as noted above.

## 2021-05-13 ENCOUNTER — TELEPHONE (OUTPATIENT)
Dept: NEUROLOGY | Facility: CLINIC | Age: 56
End: 2021-05-13

## 2021-05-13 NOTE — TELEPHONE ENCOUNTER
[place] paperwork completed and faxed to [facility]. The pt was notified.  Summer Mota RN   Neurology Care Coordinator

## 2021-09-11 ENCOUNTER — HEALTH MAINTENANCE LETTER (OUTPATIENT)
Age: 56
End: 2021-09-11

## 2022-02-26 ENCOUNTER — HEALTH MAINTENANCE LETTER (OUTPATIENT)
Age: 57
End: 2022-02-26

## 2022-04-23 ENCOUNTER — HEALTH MAINTENANCE LETTER (OUTPATIENT)
Age: 57
End: 2022-04-23

## 2022-05-03 ENCOUNTER — PRE VISIT (OUTPATIENT)
Dept: NEUROLOGY | Facility: CLINIC | Age: 57
End: 2022-05-03
Payer: COMMERCIAL

## 2022-05-03 DIAGNOSIS — G60.0 CMT (CHARCOT-MARIE-TOOTH DISEASE): Primary | ICD-10-CM

## 2022-05-03 NOTE — TELEPHONE ENCOUNTER
CMT RETURN PATIENT    Are there any goals or new issues you would like to address at this appt? Diagnosed with Ischemic optic neuropathy and would like to talk with Dr Vyas about that. Saw vascular doctor and was told that she may have Raynaud's. She wanted his thoughts on that as well.      How has your mobility been since the last office visit? She feels safer using her walker but no real significant changes in her mobility. She does not feel it is necessary to see her this time.   Have you had an increase in falls or any mobility concerns? No falls   Do you have any assistive devices (cane, walker, wheelchair) or are you interested in finding out more about how to obtain these? Has a walker and uses it often.     Any concerns about hand weakness or pain? Yes was having more pain/tightness in her left hand in March but now she is doing a little better. She did have her COVID booster in March so she wondered if that could have been the cause. She had three episodes in march of extreme burning in her hands but this has subsided.                  Do you have trouble doing things around the house such as opening jars, zipping/buttoning, writing or other activities of daily living? Still has difficulty and needs help from her  but is managing     Do you know what type of CMT you have? CMT1A         Do you have any other questions for the genetic counselor? No, not at this time    Do you wear braces such as foot orthotics or AFOs? If so, how are these working for you? If not, is this something you would like to discuss?  Goes to [facility] [employer] and have been seeing them for many years. She will message them and ask that they fax us an order for Dr Vyas to sign.     We will have a  available in clinic in case you have any questions. They can help connect you with community resources and discuss disability. She would like to meet with her to discuss the disability process     There will also be a  "representative from the [facility] here in clinic. N/A    To determine if you qualify for any CMT research studies, would you be interested in meeting with the research coordinator? N/A               Verify medications and allergies.     The patient will be scheduled to see: ADRIANO and Dr Vyas     \"We will contact you once the schedule has been completed to let you know what time you need to arrive. Disregard the automated appointment reminder call, I will call you directly to let you know what time to be here.\"      "

## 2022-05-10 ENCOUNTER — PATIENT OUTREACH (OUTPATIENT)
Dept: CARE COORDINATION | Facility: CLINIC | Age: 57
End: 2022-05-10

## 2022-05-10 ENCOUNTER — OFFICE VISIT (OUTPATIENT)
Dept: NEUROLOGY | Facility: CLINIC | Age: 57
End: 2022-05-10
Payer: COMMERCIAL

## 2022-05-10 VITALS
HEART RATE: 64 BPM | SYSTOLIC BLOOD PRESSURE: 190 MMHG | WEIGHT: 120 LBS | BODY MASS INDEX: 20.49 KG/M2 | HEIGHT: 64 IN | DIASTOLIC BLOOD PRESSURE: 112 MMHG

## 2022-05-10 DIAGNOSIS — G60.0 CMT (CHARCOT-MARIE-TOOTH DISEASE): Primary | ICD-10-CM

## 2022-05-10 PROCEDURE — 99214 OFFICE O/P EST MOD 30 MIN: CPT | Performed by: PSYCHIATRY & NEUROLOGY

## 2022-05-10 ASSESSMENT — PAIN SCALES - GENERAL: PAINLEVEL: NO PAIN (0)

## 2022-05-10 NOTE — PROGRESS NOTES
Social Work [facility]     Patient Name:  Lakesha Keita  /Age:  1965 (57 year old)    Referral Source: Dr Vyas, [facility]  Reason for Referral:  [facility]     connected with patient as part of CMT Clinic today, 5/10/22.     Lakesha resides in [place], [place] with her  Luis.  She is employed as a  and currently working 4 day/week.  She shared that she is considering requesting to transition to 3 day per week, however unsure if her employer will consider it.  Her main concerns were surrounding medical insurance if/when she would be unable to work and it that became before she is eligible for Medicare.  Lakesha shared that her  does not work outside of the home.  Lakesha's income is the sole income for the family and they both receive medical through her employment.    Discussed various options of receiving medial insurance via the county of residence if they are income eligible or the option of purchasing insurance independently, which they are familiar with having done that several years ago.    Also, discussed filing for SSDI and provided Lakesha with information to contact Disability [employer].  Provided patient with writer contact information and encouraged her to call with additional concerns or questions. Sw will continue to assist as needed.                  CYN Cagle, [facility]    [facility]  [phone]  [email or web address]

## 2022-05-10 NOTE — NURSING NOTE
FMLA paperwork completed and faxed to [facility] [employer]. Confirmed fax was sent successfully via right fax. Pt given original copy and separate copy sent to scanning.      Nita Min RNCC  Neurology/Neurosurgery/PM&R

## 2022-05-10 NOTE — PROGRESS NOTES
"Lakesha Keita's goals for this visit include:   Chief Complaint   Patient presents with     RECHECK     One year follow up       She requests these members of her care team be copied on today's visit information: yes    PCP: Michela Munguia    Referring Provider:  No referring provider defined for this encounter.    BP (!) 190/112 (BP Location: Left arm, Patient Position: Sitting, Cuff Size: Adult Regular)   Pulse 64   Ht 1.626 m (5' 4\")   Wt 54.4 kg (120 lb)   BMI 20.60 kg/m      Do you need any medication refills at today's visit? No  Manjit Sanches CMA      "

## 2022-05-10 NOTE — PATIENT INSTRUCTIONS
Have [facility] reach out to us for a new AFO order if needed.  Try wearing a wrist splint on the left wrist at night and at other times your numbness is worse, to see if it might be carpal tunnel. If the splint helps we can schedule a nerve ultrasound.  No significant changes in your examination since last year.

## 2022-05-10 NOTE — LETTER
"    5/10/2022         RE: Lakesha Keita  [address] [address] [address]        Dear Colleague,    Thank you for referring your patient, Lakesha Keita, to the [facility] [place]. Please see a copy of my visit note below.    Lakesha Keita's goals for this visit include:   Chief Complaint   Patient presents with     RECHECK     One year follow up       She requests these members of her care team be copied on today's visit information: yes    PCP: Michela Munguia    Referring Provider:  No referring provider defined for this encounter.    BP (!) 190/112 (BP Location: Left arm, Patient Position: Sitting, Cuff Size: Adult Regular)   Pulse 64   Ht 1.626 m (5' 4\")   Wt 54.4 kg (120 lb)   BMI 20.60 kg/m      Do you need any medication refills at today's visit? No  Mnajit Sanches CMA        Return visit for 57 year old woman with CMT1A. Since her prior visit she reports some activity associated numbness and pain in her non-dominant left hand. Walks with one-person assistance or assistive devices. No substantial subjective change in condition. General medical history is also notable for elevated BP at doctor visits but not at home and ischemic optic neuropathy, managed by ophthalmology. Imaging of the lower limbs since her last visit reportedly ruled out PAD.           Mental state: Alert, appropriate, speech, language, and thought content normal.     Cranial nerves II-XII normal.    Sensory examination:    Pin not worse in median distribution; negative Tinel at median nerve at wrist     Right Left   Light touch DC DC   Vibration (timed) K5 K0   Vibration (Rydell-Seiffer)     Temp     Pin WBNL WBNL   Pos knee    Legend:   MM = medial malleolus, TT = tibial tuberosity, K = patella, MCP = MCP joint  MF = mid-foot, DC = distal calf, MC = mid calf, PC = proximal calf      Motor examination:     Right Left   Shoulder abduction  5 5   Elbow extension 5 5   Elbow flexion 5 5 "   Wrist extension  5 5   Finger extension 5 5   FDI 4- 4-   APB 4- 3   Hip flexion 5 5   Knee flexion 5 5   Knee extension 5 5   Dorsiflexion 0 0   Plantar flexion 1 0   A=atrophy    Tone normal     Gait:  Awkward, wide-based, slow.       Impression:  CMT1A. Examination stable.     Recommendations (copied from patient instructions):    1. Have [facility] reach out to us for a new AFO order if needed.  2. Try wearing a wrist splint on the left wrist at night and at other times your numbness is worse, to see if it might be carpal tunnel. If the splint helps we can schedule a nerve ultrasound.  3. No significant changes in your examination since last year.    Scott Vyas M.D.      31 minutes spent on the date of the encounter on chart review, history and examination, documentation and further activities as noted above.            Again, thank you for allowing me to participate in the care of your patient.        Sincerely,        Scott Vyas MD

## 2022-05-10 NOTE — PROGRESS NOTES
Return visit for 57 year old woman with CMT1A. Since her prior visit she reports some activity associated numbness and pain in her non-dominant left hand. Walks with one-person assistance or assistive devices. No substantial subjective change in condition. General medical history is also notable for elevated BP at doctor visits but not at home and ischemic optic neuropathy, managed by ophthalmology. Imaging of the lower limbs since her last visit reportedly ruled out PAD.           Mental state: Alert, appropriate, speech, language, and thought content normal.     Cranial nerves II-XII normal.    Sensory examination:    Pin not worse in median distribution; negative Tinel at median nerve at wrist     Right Left   Light touch DC DC   Vibration (timed) K5 K0   Vibration (Rydell-Seiffer)     Temp     Pin WBNL WBNL   Pos knee    Legend:   MM = medial malleolus, TT = tibial tuberosity, K = patella, MCP = MCP joint  MF = mid-foot, DC = distal calf, MC = mid calf, PC = proximal calf      Motor examination:     Right Left   Shoulder abduction  5 5   Elbow extension 5 5   Elbow flexion 5 5   Wrist extension  5 5   Finger extension 5 5   FDI 4- 4-   APB 4- 3   Hip flexion 5 5   Knee flexion 5 5   Knee extension 5 5   Dorsiflexion 0 0   Plantar flexion 1 0   A=atrophy    Tone normal     Gait:  Awkward, wide-based, slow.       Impression:  CMT1A. Examination stable.     Recommendations (copied from patient instructions):    1. Have [facility] reach out to us for a new AFO order if needed.  2. Try wearing a wrist splint on the left wrist at night and at other times your numbness is worse, to see if it might be carpal tunnel. If the splint helps we can schedule a nerve ultrasound.  3. No significant changes in your examination since last year.    Scott Vyas M.D.      31 minutes spent on the date of the encounter on chart review, history and examination, documentation and further activities as noted above.

## 2022-09-23 ENCOUNTER — TRANSFERRED RECORDS (OUTPATIENT)
Dept: HEALTH INFORMATION MANAGEMENT | Facility: CLINIC | Age: 57
End: 2022-09-23

## 2022-10-11 ENCOUNTER — TELEPHONE (OUTPATIENT)
Dept: NEUROLOGY | Facility: CLINIC | Age: 57
End: 2022-10-11

## 2022-10-11 ENCOUNTER — MEDICAL CORRESPONDENCE (OUTPATIENT)
Dept: HEALTH INFORMATION MANAGEMENT | Facility: CLINIC | Age: 57
End: 2022-10-11

## 2022-10-11 NOTE — TELEPHONE ENCOUNTER
Writer faxed signed orders from Dr Arreaga clinic to [facility] at fax number per JONATHAN REBOLLEDO and Dr Vyas's request.  Faxed to [phone]  Confirmed delivery through right fax  ANDREW Mendoza., JOSE ALFREDO ([facility])

## 2022-10-26 ENCOUNTER — TELEPHONE (OUTPATIENT)
Dept: NEUROLOGY | Facility: CLINIC | Age: 57
End: 2022-10-26

## 2022-10-26 NOTE — TELEPHONE ENCOUNTER
"Writer received message from RN below:    Lakesha Keita  Patient Customer Service Request Pool 13 days ago     DS  Topic: Non-Medical Question.     Hi, yesterday I received a After visit summary from the Neurology clinic dated 10/11. It said I seen a Manjit RUDD MA for a telephone visit. I did not see anyone yesterday, the last time I was seen was by Dr Vyas in May of 2021. Why did I receive this?      Writer called patient and clarified that writer had faxed paperwork for DME to:    Writer faxed signed orders from Dr Arreaga clinic to [facility] at fax number per JONATHAN REBOLLEDO and Dr Vyas's request.  Faxed to [phone]  Confirmed delivery through right fax  COLIN Mendoza CMA ([facility])    Writer explained to patient and patient realized what it was for and had \" been racking her brain to remember what it was for.\"   Writer explained there was no breach of HIPPA and everyone had what they needed to get the necessary supplies and explained the fax sent and the office visit summary.  Patient was relieved and understood. Patient had no further questions and writer ended the call.  COLIN Mendoza, JOSE ALFREDO ([facility])    "

## 2022-10-29 ENCOUNTER — HEALTH MAINTENANCE LETTER (OUTPATIENT)
Age: 57
End: 2022-10-29

## 2023-04-08 ENCOUNTER — HEALTH MAINTENANCE LETTER (OUTPATIENT)
Age: 58
End: 2023-04-08

## 2023-05-03 ENCOUNTER — PRE VISIT (OUTPATIENT)
Dept: NEUROLOGY | Facility: CLINIC | Age: 58
End: 2023-05-03
Payer: COMMERCIAL

## 2023-05-03 DIAGNOSIS — G60.0 CMT (CHARCOT-MARIE-TOOTH DISEASE): Primary | ICD-10-CM

## 2023-05-03 NOTE — CONFIDENTIAL NOTE
CMT RETURN PATIENT    Do you have any questions/concerns or new issues you would like to address at your appt? Legs are a little weaker and she may need some additional adaptive equipment     How has your mobility been since the last office visit? It has been a little harder to get around   Have you had an increase in falls or any mobility concerns? 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 No     Sensory Loss  - Do you have loss of feeling anywhere in your feet or legs? Yes   - If so, does the loss of feeling extend above your toes? Just in her feet   - Does it extend above the ankle? No  - Can you identify the point where the sensation becomes normal or nearly normal? Ankle  - Are these symptoms constant (present all the time), present most of the daytime, less than one-half of the daytime, or just occasionally (Daytime is defined as the time between getting up and going to bed)? Worse in the cold weather. Occasionally      Motor Symptoms- Legs  - Do you have weakness in your legs or feet? Yes   - Do you ever trip over your toes/feet or turn or sprain your ankles? Not with her AFOs on but without yes  - Do your feet slap down on the ground when you walk? Without her AFOs yes   - Do you wear shoe inserts/insoles (below the ankle)? No  - Do you wear braces, splints or an equivalent type of orthotic that extends above your ankle? Yes  If so, how are these working for you? These are working out well for her   - Have the above ankle orthotics described above ever been prescribed or suggested by a healthcare professional? She has them  - Have you ever had surgery on your feet or ankles? No  - If so, do you know if the surgery involved fusion of bones, a transfer of tendons, heel cord lengthening or lowering of the arch? N/A  - Do you use a cane, walking stick, or walker to help you walk most of the time  "outside the home? Cane and a walker If not, do you feel adaptive equipments would be beneficial? N/A  - Do you use a wheelchair most of the time because of weakness? No     Motor Symptoms- Arms  - Do you have difficulty with buttoning clothes (standard shirt buttons)? Yes   - If yes, are the difficulties mild or severe (severe includes unable)? Medium difficulty   - Can you cut most food including meat and pizza with normal utensils? Yes   - Do you have difficulty with activities that require extending or flexing your arms, or activities using your upper arms? Not really     We will have a  available in clinic. Would you be interested in discussing any of the following topics: Not at this time    - Initiating the disability process  -Transportation issues  -Financial concerns   - Other community resources    Are you registered with the MDA (Muscular Dystrophy Association)? Yes     To determine if you qualify for any CMT research studies, would you be interested in meeting with the research coordinator? Yes   \"We will contact you once the schedule has been completed to let you know what time you need to arrive. Disregard the automated appointment reminder call, I will call you directly to let you know what time to be here.\"    Recap of services needed: PT and research     "

## 2023-05-04 NOTE — TELEPHONE ENCOUNTER
Writer left detailed message informing pt that arrival time would be 10:15am.       Nita Min, RNCC  Neurology/Neurosurgery

## 2023-05-09 ENCOUNTER — HOSPITAL ENCOUNTER (OUTPATIENT)
Dept: PHYSICAL THERAPY | Facility: CLINIC | Age: 58
Setting detail: THERAPIES SERIES
Discharge: HOME | End: 2023-05-09
Attending: PSYCHIATRY & NEUROLOGY
Payer: COMMERCIAL

## 2023-05-09 ENCOUNTER — OFFICE VISIT (OUTPATIENT)
Dept: NEUROLOGY | Facility: CLINIC | Age: 58
End: 2023-05-09
Payer: COMMERCIAL

## 2023-05-09 ENCOUNTER — DOCUMENTATION ONLY (OUTPATIENT)
Dept: NEUROLOGY | Facility: CLINIC | Age: 58
End: 2023-05-09

## 2023-05-09 DIAGNOSIS — G60.0 CMT (CHARCOT-MARIE-TOOTH DISEASE): Primary | ICD-10-CM

## 2023-05-09 PROCEDURE — 97535 SELF CARE MNGMENT TRAINING: CPT | Mod: GP | Performed by: PHYSICAL THERAPIST

## 2023-05-09 PROCEDURE — 97161 PT EVAL LOW COMPLEX 20 MIN: CPT | Mod: GP | Performed by: PHYSICAL THERAPIST

## 2023-05-09 PROCEDURE — 99215 OFFICE O/P EST HI 40 MIN: CPT | Performed by: PSYCHIATRY & NEUROLOGY

## 2023-05-09 RX ORDER — EZETIMIBE 10 MG/1
TABLET ORAL
COMMUNITY
Start: 2023-04-24

## 2023-05-09 RX ORDER — LACTOBACILLUS ACIDOPHILUS 10B CELL
CAPSULE ORAL
COMMUNITY

## 2023-05-09 RX ORDER — SERTRALINE HYDROCHLORIDE 25 MG/1
50 TABLET, FILM COATED ORAL DAILY
COMMUNITY
Start: 2022-03-02

## 2023-05-09 RX ORDER — POLYETHYLENE GLYCOL 3350 17 G/17G
POWDER, FOR SOLUTION ORAL
COMMUNITY
Start: 2020-05-01

## 2023-05-09 NOTE — PATIENT INSTRUCTIONS
No substantial change in examination.  Ask your primary care provider to check vitamin B12 level.  We will fill out your form.  Return 1 year.

## 2023-05-09 NOTE — PROGRESS NOTES
FMLA form completed and signed by Dr. Vyas. Faxed to [facility] [employer] dept at [phone]. Confirmed fax was sent successfully. Copy mailed to pt's home and copy sent to scanning.       Nita Min, RNCC  Neurology/Neurosurgery

## 2023-05-09 NOTE — NURSING NOTE
Lakesha Keita's goals for this visit include:   Chief Complaint   Patient presents with     RECHECK     1 Year follow up        She requests these members of her care team be copied on today's visit information: yes    PCP: Michela Munguia    Referring Provider:  No referring provider defined for this encounter.    There were no vitals taken for this visit.    Do you need any medication refills at today's visit? No  COLIN Mendoza, JOSE ALFREDO ([facility])

## 2023-05-09 NOTE — PROGRESS NOTES
[facility]    OUTPATIENT PHYSICAL THERAPY CLINIC NOTE  Lakesha Keita  YOB: 1965  [id number]    Type of visit:         Evaluation & Treatment     Date of service: 2023    Referring provider: Dr. Vyas    Others present at visit:  Spouse / significant other    Medical diagnosis:   CMT    Date of diagnosis:     Pertinent history of current problem (include personal factors and/or comorbidities that impact the plan of care): patient reports that she has been experiencing bilateral arm pain (right > left) & saw an orthopedist plus a PT closer to home; notes that pain has improved with medications & exercises. She denies any falls - uses her 4WW when she is out in the community. PMHx: bilateral JANEE with left hip seroma in 2014 requiring aspiration - states that left hip has remained weak since that procedure; right knee OA     Living environment:  House    Living environment barriers:  12 stairs within the house (2 railings present) - enters thru the garage (no steps)     Current assistance/living environment:  Lives with spouse      Current mobility equipment:  Orthotics: bilateral AFOs - recently replaced  4 wheeled walker with seat  Standard cane(s)     Current ADL equipment:  Shower/tub chair  Raised toilet seat  Wall grab bar  Dressing equipment: sock aid; reacher; long handle shoe horn    Technology used: cell phone, computer    Evaluation   Interview completed.   Pain assessment:  Location: bilateral arms/Ratin/10 at best; 5/10 at worst     Range of motion: exhibits mild limitations in hips bilaterally; knee ext to 0 degrees bilaterally with knees extended     Manual muscle testing: hip flex 4+/5 on right, 3-/5 on left; knee ext 4+/5 on right, 4/5 on left; knee flex 4/5 bilaterally; ankle dorsiflexion 0/5 bilaterally; ankle plantar flexion 0/5 on right, 1/5 on left   Gait:  "independent with use of bilateral AFOs & 4WW - exhibits good foot clearance & step symmetry bilaterally with fairly good gait speed; gait without use of 4WW with wide base of support & bilateral U/Es in \"high guard\" position throughout gait cycle (no armswing)   25 ft timed walk: 14 steps in 7.94 seconds with use of bilateral AFOs & 4WW                                          16 steps in 8.42 seconds without use of 4WW    Fall Risk Screen:   Has the patient fallen 2 or more times in the last year? No      Has the patient fallen and had an injury in the past year? No       Timed Up and Go Score: 11.94 seconds with bilateral AFOs & 4WW                                                        11.13 seconds with bilateral AFOs only    Is the patient a fall risk? Yes, department fall risk interventions implemented     Impairments:  Fatigue  Muscle atrophy  Balance  Pain  Range of motion     Treatment diagnosis:  Impaired mobility  Impaired activities of daily living    Clinical Presentation: Evolving/Changing  Clinical Presentation Rationale: based upon subjective information provided, objective exam findings, medical history & clinical judgement   Clinical Decision Making (Complexity): Low complexity     Recommendations/Plan of care:  1 session evaluation & treatment.     Goals:   Target date: 5/9/2023  Patient and her spouse will verbalize understanding of evaluation results and implications for functional performance.  Patient and her spouse will verbalize/demonstrate understanding of compensatory methods /equipment to enhance functional independence and safety.    Educational assessment/barriers to learning:   No barriers noted     Treatment provided this date:   Self care/home management/ADLs, 15 minutes    Response to treatment/recommendations:   Reviewed results of PT exam with patient & her spouse; reviewed importance of bilateral U/E support when ambulating without her AFOs (at night) to decrease fall risk. " Patient & her spouse verbalize understanding of education provided. Use of images to provide education regarding possible equipment recommendations to improve safety & efficiency with ADLs; specifically discussed extended tub bench with sliding, swivel seat; over the bed adjustable table to provide U/E support at various heights; bed rail. Patient and her spouse verbalize understanding of education provided, including proper use & installation of each of these items.     Goal attainment:  All goals met     Risks and benefits of evaluation/treatment have been explained.  Patient and her spouse are in agreement with Plan of Care.     Timed Code Treatment Minutes: 15 min   Total Treatment Time (sum of timed and untimed services): 45 min     Signature: Marli Hobson, PT   Date: 5/9/2023

## 2023-05-09 NOTE — LETTER
5/9/2023         RE: Lakesha Keita  [address] [address] [facility] [id number]        Dear Colleague,    Thank you for referring your patient, Lakesha Keita, to the [facility] [place]. Please see a copy of my visit note below.    Return visit for 58 year old woman with CMT1A. 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 She reports her CMT symptoms have progressed gradually but not abruptly.      Mental state: Alert, appropriate, speech, language, and thought content normal.     Cranial nerves II-XII normal, other than raspy and breathy vocal quality. She denies being aware of any change in voice or concerns about vocal quality. Non-smoker.    Sensory examination:     Right Left   Light touch Normal Normal   Vibration (timed)     Vibration (Rydell-Seiffer) K5 K4   Temp     Pin Normal Normal   Pos     Legend:   MM = medial malleolus, TT = tibial tuberosity, K = patella, MCP = MCP joint  MF = mid-foot, DC = distal calf, MC = mid calf, PC = proximal calf      Motor examination:     Right Left   Shoulder abduction  5 5   Elbow extension 5 5   Elbow flexion 5 5   Wrist extension  5 5   Finger extension 5 5   FDI 4- 4-   APB 3 3   Hip flexion 5- 4-   Knee flexion 5- 5-   Knee extension 5- 5-   Dorsiflexion 0 0   Plantar flexion 0 0   A=atrophy    Tone normal        0 1 2 3 4   Sensory symptoms None Below or at ankle bones Symptoms up to the distal half of the calf Symptoms up to the proximal half of the calf, including knee Symptoms above knee (above the top of the patella)   Motor symptoms - legs None  Trips, catches toes, slaps feet, shoe inserts Ankle support or stabilization needed most of the time for ambulation Walking aids (cane, walker) needed most of the time Wheelchair most of the time   Motor symptoms - arms None Mild difficulty with buttons Severe difficulty or unable  to do buttons Unable to cut most foods Proximal weakness (affect movements involving the elbow and above)   Pin sensibility Normal Decreased below or at ankle bones Decreased up to the distal half of the calf Decreased up to the proximal half of the calf, including knee Decreased above knee (above the  top of the patella)   Vibration  Normal Reduced at great toe Reduced at ankle Reduced at knee (tibial tuberosity) Absent at knee and ankle   Strength - legs Normal 4+, 4, or 4- on foot dorsi- or plantarflexion </= 3 on foot dorsi- or plantarflexion </= 3 on foot dorsi- and plantarflexion Proximal weakness   Strength - arms Normal 4+, 4, or 4- on intrinsic hand muscles </= 3 on intrinsic hand muscles < 5 on wrist extensors Weak above elbow   Ulnar CMAP (Median)        Radial SNAP          Impression:  CMT1A, with modest change over time. Will request LAMINE for genetic testing, done at [facility] [facility] in 2003 based upon extensive chart review and discussion with GC. She does have a 2nd degree relative with confirmed CMT1A as well, but it would be helpful to have clear documentation on the chart.   She asks about vitamin B12 - prudent to check her level; she chooses to do at PCP office.  We discussed management and research in CMT1A.    Scott Vyas M.D.    40 minutes spent on the date of the encounter on chart review, history and examination, documentation and further activities as noted above.          Again, thank you for allowing me to participate in the care of your patient.        Sincerely,        Scott Vyas MD

## 2023-05-09 NOTE — PROGRESS NOTES
Return visit for 58 year old woman with CMT1A. 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 She reports her CMT symptoms have progressed gradually but not abruptly.      Mental state: Alert, appropriate, speech, language, and thought content normal.     Cranial nerves II-XII normal, other than raspy and breathy vocal quality. She denies being aware of any change in voice or concerns about vocal quality. Non-smoker.    Sensory examination:     Right Left   Light touch Normal Normal   Vibration (timed)     Vibration (Rydell-Seiffer) K5 K4   Temp     Pin Normal Normal   Pos     Legend:   MM = medial malleolus, TT = tibial tuberosity, K = patella, MCP = MCP joint  MF = mid-foot, DC = distal calf, MC = mid calf, PC = proximal calf      Motor examination:     Right Left   Shoulder abduction  5 5   Elbow extension 5 5   Elbow flexion 5 5   Wrist extension  5 5   Finger extension 5 5   FDI 4- 4-   APB 3 3   Hip flexion 5- 4-   Knee flexion 5- 5-   Knee extension 5- 5-   Dorsiflexion 0 0   Plantar flexion 0 0   A=atrophy    Tone normal        0 1 2 3 4   Sensory symptoms None Below or at ankle bones Symptoms up to the distal half of the calf Symptoms up to the proximal half of the calf, including knee Symptoms above knee (above the top of the patella)   Motor symptoms - legs None  Trips, catches toes, slaps feet, shoe inserts Ankle support or stabilization needed most of the time for ambulation Walking aids (cane, walker) needed most of the time Wheelchair most of the time   Motor symptoms - arms None Mild difficulty with buttons Severe difficulty or unable to do buttons Unable to cut most foods Proximal weakness (affect movements involving the elbow and above)   Pin sensibility Normal Decreased below or at ankle bones Decreased up to the distal half of the calf Decreased up to the proximal half of the calf, including  knee Decreased above knee (above the  top of the patella)   Vibration  Normal Reduced at great toe Reduced at ankle Reduced at knee (tibial tuberosity) Absent at knee and ankle   Strength - legs Normal 4+, 4, or 4- on foot dorsi- or plantarflexion </= 3 on foot dorsi- or plantarflexion </= 3 on foot dorsi- and plantarflexion Proximal weakness   Strength - arms Normal 4+, 4, or 4- on intrinsic hand muscles </= 3 on intrinsic hand muscles < 5 on wrist extensors Weak above elbow   Ulnar CMAP (Median)        Radial SNAP          Impression:  CMT1A, with modest change over time. Will request LAMINE for genetic testing, done at [place] in 2003 based upon extensive chart review and discussion with GC. She does have a 2nd degree relative with confirmed CMT1A as well, but it would be helpful to have clear documentation on the chart.   She asks about vitamin B12 - prudent to check her level; she chooses to do at PCP office.  We discussed management and research in CMT1A.    Scott Vyas M.D.    40 minutes spent on the date of the encounter on chart review, history and examination, documentation and further activities as noted above.

## 2023-06-19 ENCOUNTER — TRANSFERRED RECORDS (OUTPATIENT)
Dept: HEALTH INFORMATION MANAGEMENT | Facility: CLINIC | Age: 58
End: 2023-06-19
Payer: COMMERCIAL

## 2023-06-29 ENCOUNTER — TRANSFERRED RECORDS (OUTPATIENT)
Dept: HEALTH INFORMATION MANAGEMENT | Facility: CLINIC | Age: 58
End: 2023-06-29
Payer: COMMERCIAL

## 2023-10-10 ENCOUNTER — TELEPHONE (OUTPATIENT)
Dept: CONSULT | Facility: CLINIC | Age: 58
End: 2023-10-10
Payer: COMMERCIAL

## 2023-10-10 NOTE — TELEPHONE ENCOUNTER
Lakesha contacted me and left a voicemail stating that she gives permission for me to look at her genetic test results in her medical record and share these results verbally with her nephew Zhen Gonzalezdarian.     Kimberlyn Grimm MS [facility]  Genetic Counselor  Division of Genetics and Metabolism  (p) [phone]  (f) [phone]

## 2023-11-07 ENCOUNTER — TELEPHONE (OUTPATIENT)
Dept: CONSULT | Facility: CLINIC | Age: 58
End: 2023-11-07
Payer: COMMERCIAL

## 2023-11-07 NOTE — TELEPHONE ENCOUNTER
Contacted Lakesha to address the following [employer] message sent by her to Dr Vyas.     Hi Dr Vyas,  my daughter was never diagnosed with CMT but has the classic CMT foot so we are pretty sure she has CMT. My daughter Екатерина is going to be seeing a genetic counselor in January and will be requesting the genetic test for CMT. Екатерина recently had a baby boy and she wants to know if he has CMT. My question to you is how soon can you be tested for this?  Thanks     I left a nondetailed VM    Spoke with Lakesha. Her family members are affected with CMT1A. Her daughter is getting tested in January. I advised her to wait until her daughter's test results are back prior to getting her grandson tested.If Lakesha's daughter tests positive, Lakesha's grandson can be tested but it is ok to wait until he is older to start the testing. We discussed the following scenarios:  Lakesha's grandson can be tested right after his mother. However, this is not recommended as we will not experience symptoms for years and there is no treatment for CMT1A  Lakesha's grandson should come in for testing if he has symptoms.  Lakesha's grandson can wait to get tested until her is a teenager if he does not have symptoms and there is no medication to treat CMT1A  If a medication for treatment of CMT1A becomes available for young children, Lakesha's grandson should be tested as soon as possible.  Lakesha expressed an excellent understanding of this information.    Kimberlyn Grimm MS [facility]  Genetic Counselor  Division of Genetics and Metabolism  (p) [phone]  (f) [phone]

## 2024-03-30 ENCOUNTER — HEALTH MAINTENANCE LETTER (OUTPATIENT)
Age: 59
End: 2024-03-30

## 2024-04-24 ENCOUNTER — PRE VISIT (OUTPATIENT)
Dept: NEUROLOGY | Facility: CLINIC | Age: 59
End: 2024-04-24
Payer: COMMERCIAL

## 2024-04-24 NOTE — CONFIDENTIAL NOTE
CMT RETURN PATIENT  Do you have any questions/concerns or new issues you would like to address at your appt? Wondering if there are any new advancements on medications for CMT.   How has your mobility been since the last office visit? Uses a walker quite a bit. Not had a significant change she was last seen. Doing stretches and staying active on the stationary bike and elliptical   -Have you had an increase in falls or any mobility/balance concerns? Yes, AFOs too restrictive. They have made some adjustments and doing better now.   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 CMT1A       -Do you have any questions for the genetic counselor? No questions at this time  Sensory Loss  - Do you have loss of feeling or numbness anywhere in your feet or legs? Yes  - If so, does the loss of feeling extend above your toes? yes  - Does it extend above the ankle? yes  - Can you identify the point where the sensation becomes normal or nearly normal? Just below the knee  - Are these symptoms constant (present all the time), present most of the daytime, less than one-half of the daytime, or just occasionally (Daytime is defined as the time between getting up and going to bed)? Constant    Motor Symptoms- Legs  - Do you have weakness in your legs or feet? Yes  - Do you ever trip over your toes/feet or turn or sprain your ankles? When she doesn't wear her AFOs she does.   - Do your feet slap down on the ground when you walk? Yes  - Do you wear shoe inserts/insoles (below the ankle)? No  - Do you wear braces, splints or an equivalent type of orthotic that extends above your ankle? Yes If so, how are these working for you? Working well. She has an orthotist near her home managing her AFOs   - Have the above ankle orthotics described above ever been prescribed or suggested by a healthcare professional? She has them   - Have you ever had surgery on your feet or ankles? No  - If so, do you know if the surgery involved fusion of bones, a  "transfer of tendons, heel cord lengthening or lowering of the arch? N/A  - Do you use a cane, walking stick, or walker to help you walk most of the time outside the home? Walker when outside of the house  If not, do you feel adaptive equipment would be beneficial? She is good with her current walker  - Do you use a wheelchair most of the time because of weakness? No  Motor Symptoms- Arms  - Do you have difficulty with buttoning clothes (standard shirt buttons)? Yes  - If yes, are the difficulties mild or severe (severe includes unable)? Moderate   - Can you cut most food including meat and pizza with normal utensils? She has the large handle tools. She has a hard time with normal utensils   - Do you have difficulty with activities that require extending or flexing your arms, or activities using your upper arms? Yes         -Do you have any difficulty with gripping or pinching object? Yes         -Do you have any hand splints that you currently wear or have these ever been used in the past (if yes, please ask that they bring these to the appt)? She has a left wrist brace that she wears at night. She has been to an occupational therapist a few times in the past. She has adaptive equipment at home that is working well. She declined an appt with OT     We will have a  available in clinic. Would you be interested in discussing any of the following topics: Not at this time    - Initiating the disability process  -Transportation issues  -Financial concerns   - Other community resources  Are you registered with the MDA (Muscular Dystrophy Association)? Yes  To determine if you qualify for any CMT research studies, would you be interested in meeting with the research coordinator? Yes  \"We will contact you once the schedule has been completed to let you know what time you need to arrive. Disregard the automated appointment reminder call, I will call you directly to let you know what time to be here.\"    Recap of " services needed: Research only    Summer Mota, RN Care Coordinator   Neurology/Neurosurgery/PM&R/ Pain Management

## 2024-05-21 ENCOUNTER — OFFICE VISIT (OUTPATIENT)
Dept: NEUROLOGY | Facility: CLINIC | Age: 59
End: 2024-05-21
Payer: COMMERCIAL

## 2024-05-21 DIAGNOSIS — G60.0 CMT (CHARCOT-MARIE-TOOTH DISEASE): Primary | ICD-10-CM

## 2024-05-21 PROCEDURE — 99213 OFFICE O/P EST LOW 20 MIN: CPT | Performed by: PSYCHIATRY & NEUROLOGY

## 2024-05-21 NOTE — PATIENT INSTRUCTIONS
Your examination is stable.  We discussed preliminary results of the PharNEXT clinical trial today.  Let us know if you would like to see our physical therapist, or a voice clinic referral, at any time.  Let us know if anyone in your family is interested in pre-implantation genetic diagnosis (PIGD) and we can refer to our genetic counselor for a meeting.  Return 1 year or as needed.

## 2024-05-21 NOTE — LETTER
5/21/2024         RE: Lakesha Keita  [address] [facility] [id number]        Dear Colleague,    Thank you for referring your patient, Lakesha Keita, to the [facility] [place]. Please see a copy of my visit note below.    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 Interval history from pre-visit call:    Do you have any questions/concerns or new issues you would like to address at your appt? Wondering if there are any new advancements on medications for CMT.   How has your mobility been since the last office visit? Uses a walker quite a bit. Not had a significant change she was last seen. Doing stretches and staying active on the stationary bike and elliptical   Have you had an increase in falls or any mobility/balance concerns? Yes, AFOs too restrictive. They have made some adjustments and doing better now.          0 1 2 3 4   Sensory symptoms None Below or at ankle bones Symptoms up to the distal half of the calf Symptoms up to the proximal half of the calf, including knee Symptoms above knee (above the top of the patella)   Motor symptoms - legs None  Trips, catches toes, slaps feet, shoe inserts Ankle support or stabilization needed most of the time for ambulation Walking aids (cane, walker) needed most of the time Wheelchair most of the time   Motor symptoms - arms None Mild difficulty with buttons Severe difficulty or unable to do buttons Unable to cut most foods Proximal weakness (affect movements involving the elbow and above)   Pin sensibility Normal Decreased below or at ankle bones Decreased up to the distal half of the calf Decreased up to the proximal half of the calf, including knee Decreased above knee (above the  top of the patella)   Vibration  Normal Reduced at great toe Reduced at ankle Reduced at knee (tibial tuberosity) Absent at knee and ankle   Strength - legs Normal 4+, 4, or 4- on foot dorsi- or plantarflexion </= 3 on foot dorsi- or  plantarflexion </= 3 on foot dorsi- and plantarflexion Proximal weakness   Strength - arms Normal 4+, 4, or 4- on intrinsic hand muscles </= 3 on intrinsic hand muscles < 5 on wrist extensors Weak above elbow   Ulnar CMAP (Median)        Radial SNAP           CMTES:       Examination    Foot and MSK exam:  Foot color normal, skin intact, modest calluses only.    Mental state: Alert, appropriate, speech, language, and thought content normal.     Cranial nerves II-XII normal. Voice abnormal, reason unclear. She reports no concerns in this regard.     Sensory examination:     Right Left   Light touch     Vibration (timed)     Vibration (Rydell-Seiffer) MM5 MM4   Temp     Pin Ankle Ankle   Pos     Legend:   MM = medial malleolus, TT = tibial tuberosity, K = patella, MCP = MCP joint  MF = mid-foot, DC = distal calf, MC = mid calf, PC = proximal calf      Motor examination:     Right Left   Shoulder abduction  5 5   Elbow extension 5 5   Elbow flexion 5 5   Wrist extension  5 5   Finger extension 5 5   FDI 4- 4-   APB 4- 3   Hip flexion 5- 4   Knee flexion 5 5   Knee extension 5 5   Dorsiflexion 0 0   Plantar flexion 0 0   A=atrophy    Gait:  Steppage, uses 4WW    Impression and recommendations:    Your examination is stable.  We discussed preliminary results of the PharNEXT clinical trial today.  Let us know if you would like to see our physical therapist, or a voice clinic referral, at any time.  Let us know if anyone in your family is interested in pre-implantation genetic diagnosis (PIGD) and we can refer to our genetic counselor for a meeting.  Return 1 year or as needed.    Scott Vyas M.D.    The longitudinal plan of care for CMT was addressed during this visit. Due to the added complexity in care, I will continue to support Lakesha Keita in the subsequent management of this condition and with the ongoing continuity of care of this condition.     25 minutes spent on the date of the encounter on chart review,  history and examination, documentation and further activities as noted above.           Again, thank you for allowing me to participate in the care of your patient.        Sincerely,        Scott Vyas MD

## 2024-05-21 NOTE — PROGRESS NOTES
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 Interval history from pre-visit call:    Do you have any questions/concerns or new issues you would like to address at your appt? Wondering if there are any new advancements on medications for CMT.   How has your mobility been since the last office visit? Uses a walker quite a bit. Not had a significant change she was last seen. Doing stretches and staying active on the stationary bike and elliptical   Have you had an increase in falls or any mobility/balance concerns? Yes, AFOs too restrictive. They have made some adjustments and doing better now.          0 1 2 3 4   Sensory symptoms None Below or at ankle bones Symptoms up to the distal half of the calf Symptoms up to the proximal half of the calf, including knee Symptoms above knee (above the top of the patella)   Motor symptoms - legs None  Trips, catches toes, slaps feet, shoe inserts Ankle support or stabilization needed most of the time for ambulation Walking aids (cane, walker) needed most of the time Wheelchair most of the time   Motor symptoms - arms None Mild difficulty with buttons Severe difficulty or unable to do buttons Unable to cut most foods Proximal weakness (affect movements involving the elbow and above)   Pin sensibility Normal Decreased below or at ankle bones Decreased up to the distal half of the calf Decreased up to the proximal half of the calf, including knee Decreased above knee (above the  top of the patella)   Vibration  Normal Reduced at great toe Reduced at ankle Reduced at knee (tibial tuberosity) Absent at knee and ankle   Strength - legs Normal 4+, 4, or 4- on foot dorsi- or plantarflexion </= 3 on foot dorsi- or plantarflexion </= 3 on foot dorsi- and plantarflexion Proximal weakness   Strength - arms Normal 4+, 4, or 4- on intrinsic hand muscles </= 3 on intrinsic hand muscles < 5 on wrist extensors Weak above elbow   Ulnar CMAP (Median)        Radial SNAP           CMTES:        Examination    Foot and MSK exam:  Foot color normal, skin intact, modest calluses only.    Mental state: Alert, appropriate, speech, language, and thought content normal.     Cranial nerves II-XII normal. Voice abnormal, reason unclear. She reports no concerns in this regard.     Sensory examination:     Right Left   Light touch     Vibration (timed)     Vibration (Rydell-Seiffer) MM5 MM4   Temp     Pin Ankle Ankle   Pos     Legend:   MM = medial malleolus, TT = tibial tuberosity, K = patella, MCP = MCP joint  MF = mid-foot, DC = distal calf, MC = mid calf, PC = proximal calf      Motor examination:     Right Left   Shoulder abduction  5 5   Elbow extension 5 5   Elbow flexion 5 5   Wrist extension  5 5   Finger extension 5 5   FDI 4- 4-   APB 4- 3   Hip flexion 5- 4   Knee flexion 5 5   Knee extension 5 5   Dorsiflexion 0 0   Plantar flexion 0 0   A=atrophy    Gait:  Steppage, uses 4WW    Impression and recommendations:    Your examination is stable.  We discussed preliminary results of the PharNEXT clinical trial today.  Let us know if you would like to see our physical therapist, or a voice clinic referral, at any time.  Let us know if anyone in your family is interested in pre-implantation genetic diagnosis (PIGD) and we can refer to our genetic counselor for a meeting.  Return 1 year or as needed.    Scott Vyas M.D.    The longitudinal plan of care for CMT was addressed during this visit. Due to the added complexity in care, I will continue to support Lakesha Keita in the subsequent management of this condition and with the ongoing continuity of care of this condition.     25 minutes spent on the date of the encounter on chart review, history and examination, documentation and further activities as noted above.

## 2024-12-20 ENCOUNTER — MYC MEDICAL ADVICE (OUTPATIENT)
Dept: NEUROLOGY | Facility: CLINIC | Age: 59
End: 2024-12-20
Payer: COMMERCIAL

## 2025-05-04 ENCOUNTER — HEALTH MAINTENANCE LETTER (OUTPATIENT)
Age: 60
End: 2025-05-04